# Patient Record
Sex: MALE | Race: WHITE | URBAN - METROPOLITAN AREA
[De-identification: names, ages, dates, MRNs, and addresses within clinical notes are randomized per-mention and may not be internally consistent; named-entity substitution may affect disease eponyms.]

---

## 2017-07-21 ENCOUNTER — TELEPHONE (OUTPATIENT)
Dept: FAMILY MEDICINE | Facility: CLINIC | Age: 52
End: 2017-07-21

## 2017-07-21 NOTE — TELEPHONE ENCOUNTER
7/21/2017        Patient does not have any insurance at the time and will call back to schedule when he does.        Outreach ,  Carol Mcdonald

## 2018-08-16 NOTE — PROGRESS NOTES
SUBJECTIVE:   Darin Wang is a 53 year old male who presents to clinic today for the following health issues:      Darin presents as a new patient to me regarding worsening depression symptoms over the past year, insomnia, family/life stress, left testicular mass enlargement, smoking cessation, and restless leg syndrome.    Depression - PHQ-9 of 26 with thoughts that he would maybe be better off dead that he often has first thing in the morning.  He was on zoloft in the past for a short time however doesn't remember if that helped.  No plans for therapy.    PHQ-9 SCORE 12/18/2012 6/10/2013 8/17/2018   Total Score 18 18 -   Total Score - - 26       Stress - lost his job, was homeless, is now living with his mother who he takes care of, which is a source of worry and stress for him.  Family members come to him for help, he helps other people in the neighborhood as well and he feels that it's too much to deal with at times.  He feels this is making his depression worse.    Insomnia - patient wakes up every 20 mins or so, lives in loud home, neighbors drink often and are loud outside at night.  Has tried over the counter medications, however not effective.    Testicular mass - diagnosed with testicular cyst in 2007 2.5cm in diameter, he was told to see a specialist if the mass changed.  He states that it has gotten larger since he was diagnosed, no other symptoms.    Tobacco use - patient smokes 5-6 cig per day and has been smoking since he was a teenager.  He has used chantix in the past which helped him cut down significantly and he would like to start taking it again.    Problem list and histories reviewed & adjusted, as indicated.  Additional history: as documented    BP Readings from Last 3 Encounters:   08/17/18 130/84   09/14/16 130/80   01/14/16 124/76    Wt Readings from Last 3 Encounters:   08/17/18 196 lb (88.9 kg)   09/14/16 199 lb 8 oz (90.5 kg)   01/14/16 199 lb 9.6 oz (90.5 kg)        Reviewed and  "updated as needed this visit by clinical staff  Tobacco  Allergies  Meds  Problems       Reviewed and updated as needed this visit by Provider  Allergies  Meds  Problems         ROS:  Constitutional, HEENT, cardiovascular, pulmonary, GI, , musculoskeletal, neuro, skin, endocrine and psych systems are negative, except as otherwise noted.    OBJECTIVE:     /84  Pulse 95  Temp 97.5  F (36.4  C) (Oral)  Resp 16  Ht 6' 0.09\" (1.831 m)  Wt 196 lb (88.9 kg)  SpO2 96%  BMI 26.52 kg/m2  Body mass index is 26.52 kg/(m^2).  GENERAL: healthy, alert, tearful and anxious during conversation  EYES: Eyes grossly normal to inspection, PERRL and conjunctivae and sclerae normal  HENT: ear canals and TM's normal, nose and mouth without ulcers or lesions  NECK: no adenopathy, no asymmetry, masses, or scars and thyroid normal to palpation  RESP: lungs clear to auscultation - no rales, rhonchi or wheezes  CV: regular rate and rhythm, normal S1 S2, no S3 or S4, no murmur, click or rub, no peripheral edema and peripheral pulses strong  ABDOMEN: soft, nontender, no hepatosplenomegaly, no masses and bowel sounds normal   - large left scrotal mass, nontender  MS: no gross musculoskeletal defects noted, no edema  SKIN: left maxillary contusion, no suspicious lesions or rashes  NEURO: Normal strength and tone, mentation intact and speech normal  PSYCH: mentation appears normal, tearful, anxious, normal speech    ASSESSMENT/PLAN:     1. Severe episode of recurrent major depressive disorder, without psychotic features (H)  Will start prozac, klonopin as needed, therapy recommended and patient will consider, follow-up in 3 weeks for dose adjustment.  - FLUoxetine (PROZAC) 10 MG capsule; Take 1 capsule (10 mg) by mouth daily  Dispense: 30 capsule; Refill: 1  - clonazePAM (KLONOPIN) 0.5 MG tablet; Take 0.5-1 tablets (0.25-0.5 mg) by mouth 2 times daily as needed for anxiety  Dispense: 20 tablet; Refill: 0    2. Primary " insomnia  - QUEtiapine (SEROQUEL) 100 MG tablet; Take 0.5 tablets (50 mg) by mouth At Bedtime  Dispense: 30 tablet; Refill: 1  - clonazePAM (KLONOPIN) 0.5 MG tablet; Take 0.5-1 tablets (0.25-0.5 mg) by mouth 2 times daily as needed for anxiety  Dispense: 20 tablet; Refill: 0    3. Stress and adjustment reaction  - clonazePAM (KLONOPIN) 0.5 MG tablet; Take 0.5-1 tablets (0.25-0.5 mg) by mouth 2 times daily as needed for anxiety  Dispense: 20 tablet; Refill: 0    4. Cyst of scrotum  - US Testicular and Scrotum; Future  - UROLOGY ADULT REFERRAL    5. Encounter for tobacco use cessation counseling  Potential side effects discussed along with potential for worsening suicidal ideation, patient understands.  - varenicline (CHANTIX STARTING MONTH CHANTALE) 0.5 MG X 11 & 1 MG X 42 tablet; Take 0.5 mg tab daily for 3 days, then 0.5 mg tab twice daily for 4 days, then 1 mg twice daily.  Dispense: 53 tablet; Refill: 0    A total of 45 minutes spent face-to-face with greater than 50% of the time spent in counseling and coordinating cares of the issues above    Follow-up in 3 weeks    Mario Rosenthal MD  [facility]

## 2018-08-17 ENCOUNTER — OFFICE VISIT (OUTPATIENT)
Dept: FAMILY MEDICINE | Facility: CLINIC | Age: 53
End: 2018-08-17
Payer: COMMERCIAL

## 2018-08-17 VITALS
WEIGHT: 196 LBS | RESPIRATION RATE: 16 BRPM | HEIGHT: 72 IN | OXYGEN SATURATION: 96 % | BODY MASS INDEX: 26.55 KG/M2 | DIASTOLIC BLOOD PRESSURE: 84 MMHG | TEMPERATURE: 97.5 F | HEART RATE: 95 BPM | SYSTOLIC BLOOD PRESSURE: 130 MMHG

## 2018-08-17 DIAGNOSIS — F51.01 PRIMARY INSOMNIA: ICD-10-CM

## 2018-08-17 DIAGNOSIS — F33.2 SEVERE EPISODE OF RECURRENT MAJOR DEPRESSIVE DISORDER, WITHOUT PSYCHOTIC FEATURES (H): Primary | ICD-10-CM

## 2018-08-17 DIAGNOSIS — Z71.6 ENCOUNTER FOR TOBACCO USE CESSATION COUNSELING: ICD-10-CM

## 2018-08-17 DIAGNOSIS — F43.29 STRESS AND ADJUSTMENT REACTION: ICD-10-CM

## 2018-08-17 DIAGNOSIS — L72.9 CYST OF SCROTUM: ICD-10-CM

## 2018-08-17 PROCEDURE — 99214 OFFICE O/P EST MOD 30 MIN: CPT | Performed by: FAMILY MEDICINE

## 2018-08-17 RX ORDER — QUETIAPINE FUMARATE 100 MG/1
50 TABLET, FILM COATED ORAL AT BEDTIME
Qty: 30 TABLET | Refills: 1 | Status: SHIPPED | OUTPATIENT
Start: 2018-08-17 | End: 2018-11-27 | Stop reason: ALTCHOICE

## 2018-08-17 RX ORDER — CLONAZEPAM 0.5 MG/1
0.25-0.5 TABLET ORAL 2 TIMES DAILY PRN
Qty: 20 TABLET | Refills: 0 | Status: SHIPPED | OUTPATIENT
Start: 2018-08-17 | End: 2018-11-27 | Stop reason: ALTCHOICE

## 2018-08-17 RX ORDER — FLUOXETINE 10 MG/1
10 CAPSULE ORAL DAILY
Qty: 30 CAPSULE | Refills: 1 | Status: SHIPPED | OUTPATIENT
Start: 2018-08-17 | End: 2018-11-27 | Stop reason: ALTCHOICE

## 2018-08-17 ASSESSMENT — PATIENT HEALTH QUESTIONNAIRE - PHQ9: 5. POOR APPETITE OR OVEREATING: MORE THAN HALF THE DAYS

## 2018-08-17 ASSESSMENT — ANXIETY QUESTIONNAIRES
6. BECOMING EASILY ANNOYED OR IRRITABLE: NEARLY EVERY DAY
7. FEELING AFRAID AS IF SOMETHING AWFUL MIGHT HAPPEN: NOT AT ALL
5. BEING SO RESTLESS THAT IT IS HARD TO SIT STILL: NEARLY EVERY DAY
2. NOT BEING ABLE TO STOP OR CONTROL WORRYING: NEARLY EVERY DAY
GAD7 TOTAL SCORE: 16
3. WORRYING TOO MUCH ABOUT DIFFERENT THINGS: NEARLY EVERY DAY
IF YOU CHECKED OFF ANY PROBLEMS ON THIS QUESTIONNAIRE, HOW DIFFICULT HAVE THESE PROBLEMS MADE IT FOR YOU TO DO YOUR WORK, TAKE CARE OF THINGS AT HOME, OR GET ALONG WITH OTHER PEOPLE: EXTREMELY DIFFICULT
1. FEELING NERVOUS, ANXIOUS, OR ON EDGE: MORE THAN HALF THE DAYS

## 2018-08-17 NOTE — MR AVS SNAPSHOT
After Visit Summary   8/17/2018    Darin Wang    MRN: [id number]           Patient Information     Date Of Birth          1965        Visit Information        Provider Department      8/17/2018 10:20 AM Mario Jaime MD [facility]        Today's Diagnoses     Severe episode of recurrent major depressive disorder, without psychotic features (H)    -  1    Primary insomnia        Stress and adjustment reaction        Cyst of scrotum        Encounter for tobacco use cessation counseling          Care Instructions      Depression  Depression is one of the most common mental health problems today. It is not just a state of unhappiness or sadness. It is a true disease. The cause seems to be related to a decrease in chemicals that transmit signals in the brain. Having a family history of depression, alcoholism, or suicide increases the risk. Chronic illness, chronic pain, migraine headaches, and high emotional stress also increase the risk.  Depression is something we tend to recognize in others, but may have a hard time seeing in ourselves. It can show in many physical and emotional ways:    Loss of appetite    Overeating    Not being able to sleep    Sleeping too much    Tiredness not related to physical exertion    Restlessness or irritability    Slowness of movement or speech    Feeling depressed or withdrawn    Loss of interest in things you once enjoyed    Trouble concentrating, poor memory, trouble making decisions    Thoughts of harming or killing oneself, or thoughts that life is not worth living    Low self-esteem  The treatment for depression may include both medicine and psychotherapy. Antidepressants can reduce suffering and can improve the ability to function during the depressed period. Therapy can offer emotional support and help you understand emotional factors that may be causing the depression.  Home care    Ongoing care and support help people  manage this disease. Find a healthcare provider and therapist who meet your needs. Seek help when you feel like you may be getting ill.    Be kind to yourself. Make it a point to do things that you enjoy (gardening, walking in nature, going to a movie). Reward yourself for small successes.    Take care of your physical body. Eat a balanced diet (low in saturated fat and high in fruits and vegetables). Exercise at least 3 times a week for 30 minutes. Even mild-moderate exercise (like brisk walking) can make you feel better.    Don't drink alcohol, which can make depression worse.    Take medicine as prescribed.    Tell each of your healthcare providers about all of the prescription and over-the-counter medicines, vitamins, and supplements you take. Certain supplements interact with medicines and can result in dangerous side effects. Ask your pharmacist when you have questions about medicine interactions.    Talk with your family and trusted friends about your feelings and thoughts. Ask them to help you recognize behavior changes early so you can get help and, if needed, medicine can be adjusted.  Follow-up care  Follow up with your healthcare provider, or as advised.  Call 911  Call 911 if you:    Have suicidal thoughts, a suicide plan, and the means to carry out the plan; or serious thoughts of hurting someone else     Have trouble breathing    Are very confused    Feel very drowsy or have trouble awakening    Faint or lose consciousness    Have new chest pain that becomes more severe, lasts longer, or spreads into your shoulder, arm, neck, jaw, or back  When to seek medical advice  Call your healthcare provider right away if any of these happen:    Feeling extreme depression, fear, anxiety, or anger toward yourself or others    Feeling out of control    Feeling that you may try to harm yourself or another    Hearing voices that others do not hear    Seeing things that others do not see    Can t sleep or eat for 3  days in a row    Friends or family express concern over your behavior and ask you to seek help  Date Last Reviewed: 10/1/2017    1802-2131 [employer]. [address] [facility], [place] [address]. All rights reserved. This information is not intended as a substitute for professional medical care. Always follow your healthcare professional's instructions.        Depression Affects Your Mind and Body  Everyone feels sad or  blue  from time to time for a few days or weeks. Depression is when these feelings don't go away and they interfere with daily life.  Depression is a real illness that can develop at any age. It is one of the most common mental health problems in the [place] Depression makes you feel sad, helpless, and hopeless. It gets in the way of your life and relationships. It inhibits your ability to think and act. But, with help, you can feel better again.      When I was depressed, I felt awful. I was so tired all the time I could hardly think, but at night I couldn t fall asleep. My head hurt. My stomach hurt. I didn t know what was wrong with me.    Depression affects your whole body  Brain chemicals affect your body as well as your mood. So depression may do more than just make you feel low. You may also feel bad physically. Depression can:    Cause trouble with mental tasks such as remembering, concentrating, or making decisions    Make you feel nervous and jumpy    Cause trouble sleeping. Or you may sleep too much    Change your appetite    Cause headaches, stomachaches, or other aches and pains    Drain your body of energy  Depression and other illness  It is common for people who have chronic health problems to also have depression. It can often be hard to tell which one caused the other. A person might become depressed after finding out they have a health problem. But some studies suggest being depressed may make certain health problems more likely. And some depressed people stop taking  care of themselves. This may make them more likely to get sick.  Date Last Reviewed: 1/1/2017 2000-2017 [employer]. [address] [facility], [place] [address]. All rights reserved. This information is not intended as a substitute for professional medical care. Always follow your healthcare professional's instructions.          Treating Insomnia  Good sleeping habits are a key part of treatment. If needed, some medications may help you sleep better at first. Making healthy lifestyle changes and learning to relax can improve your sleep. Treating insomnia takes commitment, but trust that your efforts will pay off. Talk to your health care provider before taking any medication.    Healthy Lifestyle  Your lifestyle affects your health and your sleep. Here are some healthy habits:    Keep a regular sleep schedule. Go to bed and get up at the same time each day.    Exercise regularly. It may help you reduce stress. Avoid strenuous exercise for two to four hours before bedtime.    Avoid or limit naps.    Use your bed only for sleep and sex.    Don t spend too much time in bed trying to fall asleep. If you can t fall asleep, get up and do something until you become tired and drowsy.    Avoid or limit caffeine and nicotine. They can keep you awake at night. Also avoid alcohol. It may help you fall asleep at first, but your sleep will not be restful.  Before Bedtime  To sleep better every night, try these tips:    Have a bedtime routine to let your body and mind know when it s time to sleep.    Going to bed should be relaxing so try to do only relaxing things around bedtime. Sleep will come sooner.    If your worries don t let you sleep, write them down in a diary. Then close it, and go to bed.    Make sure the room is not too hot or too cold. If it s not dark enough, an eye mask can help. If it s noisy, try using earplugs.  Learn to Relax  Stress, anxiety, and body tension may keep you awake at night. To  unwind before bedtime, try reading a book, meditation, or yoga. Also, try the following:    Deep breathing. Sit or lie back in a chair. Take a slow, deep breath. Hold it for 5 counts. Then breathe out slowly through your mouth. Keep doing this until you feel relaxed.    Imagery. Think of the last fun trip you took. In your mind, walk through the trip from start to finish. Put as much detail into the memory as you can remember. It will help you relax.  Cognitive Behavioral Therapy (CBT)  CBT is the most effective treatment for long-term insomnia. It tries to address the underlying causes of your sleep problems, including your habits and how you think about sleep.   Individual Therapy  Farshad Dupree, PhD  Insomnia   [facility] [facility]: [phone]    [facility]: [phone]  Group Therapy  Dates and times to be announced.  Online Programs    www.ThinkCER[employer] (pronounced shut eye). There is a fee for this program. Enter the code  [place]  if you decide to enroll in this program.     www.sleepIO.com (pronounced sleep ee oh). There is a fee for this program. Enter the code  [place]  if you decide to enroll in this program.  Suggested Resources  Insomnia Treatment Books:    Overcoming Insomnia by Chris Beck and Cindy Amaral (2008)    No More Sleepless Nights by James Kirby and Leticia Mancini (1996)    Say Aramis to Insomnia by James Guillen (2009)    The Insomnia Workbook by Mony Wylie and Jesus Cornelius (2009)    The Insomnia Answer by Otilio Cardoza and Canelo Esparza (2006)?  Stress Management and Relaxation Books:    The Relaxation and Stress Reduction Workbook by Juana Gates, Estelle Watkins and Rodolfo Hassan (2008)    Stress Management Workbook: Techniques and Self-Assessment Procedures by Felecia Silva and Chaparro Pastrana (1997)    A Mindfulness-Based Stress Reduction Workbook by Abdullahi Franks and Ivory Hamilton  (2010)    The Complete Stress Management Workbook by Rolando Corrigan, Aaron Mann and Cornelio Garcia (1996)    Assert Yourself by Shari Lemus and Alonzo Lemus (1977)  Relaxation Resources for Computer Download   These websites offer resources to help you relax. This list is for information only. [facility] is not responsible for the quality of services or the actions of any person or organization  Progressive Muscle Relaxation (PMR):    http://www.RealOps/progressive-muscle-relaxation-exercise.html    http://studentsupport.[facility]/counseling/resources/self-help/relaxation-and-stress-management/  Deep Breathing Exercises:    http://www.RealOps/breathing-awareness.html  Meditation:       www[facility] Services    www.zoiduguidedWho Can Fix My Carmeditation-site.com You may have to pay for some of these resources.  Guided Imagery:    http://www.RealOps/guided-imagery-scripts.html    http://[facility]/library/olbvbrfsle-laelnh-picqgdt/  Counseling / Behavioral Health  [facility]  Visit www.[email or web address].org or call [phone] to find a clinic close to you.   This is not a prescription and these resources are optional. You must pay for any costs when using these resources. Please ask your insurance carrier if you can be reimbursed for these resources. If so, you are responsible for sending the needed details to your insurance carrier. These resources may also be tax deductible as medical expenses. Check with your .  These programs and publications are not affiliated in any way with [facility].    2748-8002 [employer]. [address] [place] [address], [place] [address]. All rights reserved. This information is not intended as a substitute for professional medical care. Always follow your healthcare professional's instructions.  This information has been modified by your health care provider with permission from the  "publisher.      Tips for Sleep Hygiene  \"Sleep hygiene\" means having good sleep habits.Follow these tips to sleep better at night:     Get on a schedule. Go to bed and get up at about the same time every day.    Listen to your body. Only try to sleep when you actually feel tired or sleepy.    Be patient. If you haven't been able to get to sleep after about 30 minutes or more, get up and do something calming or boring until you feel sleepy. Then return to bed and try again.    Don't have caffeine (coffee, tea, cola drinks, chocolate and some medicines), alcohol or nicotine (cigarettes). These can make it harder for you to fall asleep and stay asleep.    Use your bed for sleeping only. That means no TV, computer or homework in bed, especially during the evening and before bedtime.    Don't nap during the day. If you must nap, make sure it is for less than 20 minutes.    Create sleep rituals that remind your body it is time to sleep. Examples include breathing exercises, stretching or reading a book.    Avoid all electronic media (smart phone, computer, tablet) within 2 hours of bed time. The \"blue light\" in these devices activates the part of the brain that keeps you awake.    Dim the lights at night.    Get early morning sources of light (walk in the sunshine) to help set sleep patterns at night.    Try a bath or shower before bed. Having a warm bath 1 to 2 hours before bedtime can help you feel sleepy. Hot baths can make you alert, so be mindful of the temperature.    Don't watch the clock. Checking the clock during the night can wake you up. It can also lead to negative thoughts such as, \"I will never fall asleep,\" which can increase anxiety and sleeplessness.    Use a sleep diary. Track your sleep schedule to know your sleep patterns and to see where you can improve.    Get regular exercise every day. Try not to do heavy exercise in the 4 hours before bedtime.    Eat a healthy, balanced diet.    Try eating a " light, healthy snack before bed, but avoid eating a heavy meal.    Create the right sleeping area. A cool, dark, quiet room is best. If needed, try earplugs, fans and blackout curtains.    Keep your daytime routine the same even if you have a bad night sleep. Avoiding activities the next day can make it harder to sleep.  For informational purposes only. Not to replace the advice of your health care provider.   Copyright   2013 [facility]. All rights reserved. [employer].fm 908956 - 01/16.  [facility]    If you have any questions regarding to your visit please contact your care team:       Team Purple:   Clinic Hours Telephone Number   Dr. Caron Rosenthal   7am-7pm  Monday - Thursday   7am-5pm  Fridays  [phone]  (Appointment scheduling available 24/7)    Questions about your recent visit?   Team Line:  [phone]   Urgent Care - [place] - 11am-9pm Monday-Friday Saturday-Sunday- 9am-5pm   [place] - 5pm-9pm Monday-Friday Saturday-Sunday- 9am-5pm  [phone] - [place]  [phone] [facility]       What options do I have for a visit other than an office visit? We offer electronic visits (e-visits) and telephone visits, when medically appropriate.  Please check with your medical insurance to see if these types of visits are covered, as you will be responsible for any charges that are not paid by your insurance.      You can use MediaTrust (secure electronic communication) to access to your chart, send your primary care provider a message, or make an appointment. Ask a team member how to get started.     For a price quote for your services, please call our Consumer Price Line at [phone] or our Imaging Cost estimation line at [phone] (for imaging tests).    Susan Nichols MA            Follow-ups after your visit        Additional Services     UROLOGY ADULT REFERRAL       Your provider has  referred you to: [facility] Z[facility] Fried [phone]   [email or web address]    Please be aware that coverage of these services is subject to the terms and limitations of your health insurance plan.  Call member services at your health plan with any benefit or coverage questions.      Please bring the following with you to your appointment:    (1) Any X-Rays, CTs or MRIs which have been performed.  Contact the facility where they were done to arrange for  prior to your scheduled appointment.    (2) List of current medications  (3) This referral request   (4) Any documents/labs given to you for this referral                  Follow-up notes from your care team     Return in about 3 weeks (around 9/7/2018).      Future tests that were ordered for you today     Open Future Orders        Priority Expected Expires Ordered    US Testicular and Scrotum Routine  8/17/2019 8/17/2018            Who to contact     If you have questions or need follow up information about today's clinic visit or your schedule please contact [facility] directly at [phone].  Normal or non-critical lab and imaging results will be communicated to you by MyChart, letter or phone within 4 business days after the clinic has received the results. If you do not hear from us within 7 days, please contact the clinic through VIOSOhart or phone. If you have a critical or abnormal lab result, we will notify you by phone as soon as possible.  Submit refill requests through [employer] or call your pharmacy and they will forward the refill request to us. Please allow 3 business days for your refill to be completed.          Additional Information About Your Visit        VIOSOhart Information     [employer] gives you secure access to your electronic health record. If you see a primary care provider, you can also send messages to your care team and make appointments. If you have questions,  "please call your primary care clinic.  If you do not have a primary care provider, please call [phone] and they will assist you.        Care EveryWhere ID     This is your Care EveryWhere ID. This could be used by other organizations to access your [place] medical records  [id number]        Your Vitals Were     Pulse Temperature Respirations Height Pulse Oximetry BMI (Body Mass Index)    95 97.5  F (36.4  C) (Oral) 16 6' 0.09\" (1.831 m) 96% 26.52 kg/m2       Blood Pressure from Last 3 Encounters:   08/17/18 130/84   09/14/16 130/80   01/14/16 124/76    Weight from Last 3 Encounters:   08/17/18 196 lb (88.9 kg)   09/14/16 199 lb 8 oz (90.5 kg)   01/14/16 199 lb 9.6 oz (90.5 kg)              We Performed the Following     UROLOGY ADULT REFERRAL          Today's Medication Changes          These changes are accurate as of 8/17/18 11:39 AM.  If you have any questions, ask your nurse or doctor.               Start taking these medicines.        Dose/Directions    clonazePAM 0.5 MG tablet   Commonly known as:  klonoPIN   Used for:  Severe episode of recurrent major depressive disorder, without psychotic features (H), Primary insomnia, Stress and adjustment reaction   Started by:  Mario Jaime MD        Dose:  0.25-0.5 mg   Take 0.5-1 tablets (0.25-0.5 mg) by mouth 2 times daily as needed for anxiety   Quantity:  20 tablet   Refills:  0       FLUoxetine 10 MG capsule   Commonly known as:  PROzac   Used for:  Severe episode of recurrent major depressive disorder, without psychotic features (H)   Started by:  Mario Jaime MD        Dose:  10 mg   Take 1 capsule (10 mg) by mouth daily   Quantity:  30 capsule   Refills:  1       QUEtiapine 100 MG tablet   Commonly known as:  SEROquel   Used for:  Primary insomnia   Started by:  Mario Jaime MD        Dose:  50 mg   Take 0.5 tablets (50 mg) by mouth At Bedtime   Quantity:  30 tablet   Refills:  1       "   These medicines have changed or have updated prescriptions.        Dose/Directions    * varenicline 1 MG tablet   Commonly known as:  CHANTIX   This may have changed:  Another medication with the same name was added. Make sure you understand how and when to take each.   Used for:  Smoker   Changed by:  Mario Jaime MD        Dose:  1 mg   Take 1 tablet (1 mg) by mouth 2 times daily   Quantity:  56 tablet   Refills:  1       * varenicline 0.5 MG X 11 & 1 MG X 42 tablet   Commonly known as:  CHANTIX STARTING MONTH PAK   This may have changed:  You were already taking a medication with the same name, and this prescription was added. Make sure you understand how and when to take each.   Used for:  Encounter for tobacco use cessation counseling   Changed by:  Mario Jaime MD        Take 0.5 mg tab daily for 3 days, then 0.5 mg tab twice daily for 4 days, then 1 mg twice daily.   Quantity:  53 tablet   Refills:  0       * Notice:  This list has 2 medication(s) that are the same as other medications prescribed for you. Read the directions carefully, and ask your doctor or other care provider to review them with you.         Where to get your medicines      These medications were sent to [id number]  [place] - [address] [facility] [address]  [address] [place] [address]     Phone:  [phone]     FLUoxetine 10 MG capsule    QUEtiapine 100 MG tablet    varenicline 0.5 MG X 11 & 1 MG X 42 tablet         Some of these will need a paper prescription and others can be bought over the counter.  Ask your nurse if you have questions.     Bring a paper prescription for each of these medications     clonazePAM 0.5 MG tablet                Primary Care Provider Office Phone # Fax #    Mario Jaime [phone] [address]  [place] [address]        Equal Access to Services      FERMIN DUNLAP : Hadii aad ku beatriz [place], waaxda luqadaha, qaybta kaalmada ademichael, tracey nikolasin hayaaregulo olmstead mayelin estelle . [facility] [phone].    ATENCIÓN: Si habla español, tiene a garcia disposición servicios gratuitos de asistencia lingüística. Llame al [phone].    We comply with applicable federal civil rights laws and [place] laws. We do not discriminate on the basis of race, color, national origin, age, disability, sex, sexual orientation, or gender identity.            Thank you!     Thank you for choosing [facility]  for your care. Our goal is always to provide you with excellent care. Hearing back from our patients is one way we can continue to improve our services. Please take a few minutes to complete the written survey that you may receive in the mail after your visit with us. Thank you!             Your Updated Medication List - Protect others around you: Learn how to safely use, store and throw away your medicines at www.disposemymeds.org.          This list is accurate as of 8/17/18 11:39 AM.  Always use your most recent med list.                   Brand Name Dispense Instructions for use Diagnosis    clonazePAM 0.5 MG tablet    klonoPIN    20 tablet    Take 0.5-1 tablets (0.25-0.5 mg) by mouth 2 times daily as needed for anxiety    Severe episode of recurrent major depressive disorder, without psychotic features (H), Primary insomnia, Stress and adjustment reaction       FLUoxetine 10 MG capsule    PROzac    30 capsule    Take 1 capsule (10 mg) by mouth daily    Severe episode of recurrent major depressive disorder, without psychotic features (H)       multivitamin Tabs tablet      Take 1 tablet by mouth daily        pramipexole 0.125 MG tablet    MIRAPEX    60 tablet    TAKE 1 TO 2 TABLETS BY MOUTH AT BEDTIME    Tobacco dependence syndrome       QUEtiapine 100 MG tablet    SEROquel    30 tablet    Take 0.5 tablets (50 mg) by mouth At Bedtime    Primary insomnia       *  varenicline 1 MG tablet    CHANTIX    56 tablet    Take 1 tablet (1 mg) by mouth 2 times daily    Smoker       * varenicline 0.5 MG X 11 & 1 MG X 42 tablet    CHANTIX STARTING MONTH CHANTALE    53 tablet    Take 0.5 mg tab daily for 3 days, then 0.5 mg tab twice daily for 4 days, then 1 mg twice daily.    Encounter for tobacco use cessation counseling       * Notice:  This list has 2 medication(s) that are the same as other medications prescribed for you. Read the directions carefully, and ask your doctor or other care provider to review them with you.

## 2018-08-17 NOTE — PATIENT INSTRUCTIONS
Depression  Depression is one of the most common mental health problems today. It is not just a state of unhappiness or sadness. It is a true disease. The cause seems to be related to a decrease in chemicals that transmit signals in the brain. Having a family history of depression, alcoholism, or suicide increases the risk. Chronic illness, chronic pain, migraine headaches, and high emotional stress also increase the risk.  Depression is something we tend to recognize in others, but may have a hard time seeing in ourselves. It can show in many physical and emotional ways:    Loss of appetite    Overeating    Not being able to sleep    Sleeping too much    Tiredness not related to physical exertion    Restlessness or irritability    Slowness of movement or speech    Feeling depressed or withdrawn    Loss of interest in things you once enjoyed    Trouble concentrating, poor memory, trouble making decisions    Thoughts of harming or killing oneself, or thoughts that life is not worth living    Low self-esteem  The treatment for depression may include both medicine and psychotherapy. Antidepressants can reduce suffering and can improve the ability to function during the depressed period. Therapy can offer emotional support and help you understand emotional factors that may be causing the depression.  Home care    Ongoing care and support help people manage this disease. Find a healthcare provider and therapist who meet your needs. Seek help when you feel like you may be getting ill.    Be kind to yourself. Make it a point to do things that you enjoy (gardening, walking in nature, going to a movie). Reward yourself for small successes.    Take care of your physical body. Eat a balanced diet (low in saturated fat and high in fruits and vegetables). Exercise at least 3 times a week for 30 minutes. Even mild-moderate exercise (like brisk walking) can make you feel better.    Don't drink alcohol, which can make depression  worse.    Take medicine as prescribed.    Tell each of your healthcare providers about all of the prescription and over-the-counter medicines, vitamins, and supplements you take. Certain supplements interact with medicines and can result in dangerous side effects. Ask your pharmacist when you have questions about medicine interactions.    Talk with your family and trusted friends about your feelings and thoughts. Ask them to help you recognize behavior changes early so you can get help and, if needed, medicine can be adjusted.  Follow-up care  Follow up with your healthcare provider, or as advised.  Call 911  Call 911 if you:    Have suicidal thoughts, a suicide plan, and the means to carry out the plan; or serious thoughts of hurting someone else     Have trouble breathing    Are very confused    Feel very drowsy or have trouble awakening    Faint or lose consciousness    Have new chest pain that becomes more severe, lasts longer, or spreads into your shoulder, arm, neck, jaw, or back  When to seek medical advice  Call your healthcare provider right away if any of these happen:    Feeling extreme depression, fear, anxiety, or anger toward yourself or others    Feeling out of control    Feeling that you may try to harm yourself or another    Hearing voices that others do not hear    Seeing things that others do not see    Can t sleep or eat for 3 days in a row    Friends or family express concern over your behavior and ask you to seek help  Date Last Reviewed: 10/1/2017    1637-8739 [employer]. [address] [place] [address]. All rights reserved. This information is not intended as a substitute for professional medical care. Always follow your healthcare professional's instructions.        Depression Affects Your Mind and Body  Everyone feels sad or  blue  from time to time for a few days or weeks. Depression is when these feelings don't go away and they interfere with daily life.   Depression is a real illness that can develop at any age. It is one of the most common mental health problems in the [place] Depression makes you feel sad, helpless, and hopeless. It gets in the way of your life and relationships. It inhibits your ability to think and act. But, with help, you can feel better again.      When I was depressed, I felt awful. I was so tired all the time I could hardly think, but at night I couldn t fall asleep. My head hurt. My stomach hurt. I didn t know what was wrong with me.    Depression affects your whole body  Brain chemicals affect your body as well as your mood. So depression may do more than just make you feel low. You may also feel bad physically. Depression can:    Cause trouble with mental tasks such as remembering, concentrating, or making decisions    Make you feel nervous and jumpy    Cause trouble sleeping. Or you may sleep too much    Change your appetite    Cause headaches, stomachaches, or other aches and pains    Drain your body of energy  Depression and other illness  It is common for people who have chronic health problems to also have depression. It can often be hard to tell which one caused the other. A person might become depressed after finding out they have a health problem. But some studies suggest being depressed may make certain health problems more likely. And some depressed people stop taking care of themselves. This may make them more likely to get sick.  Date Last Reviewed: 1/1/2017 2000-2017 [employer]. [address] [place] [address], [place] [address]. All rights reserved. This information is not intended as a substitute for professional medical care. Always follow your healthcare professional's instructions.          Treating Insomnia  Good sleeping habits are a key part of treatment. If needed, some medications may help you sleep better at first. Making healthy lifestyle changes and learning to relax can improve your sleep. Treating insomnia  takes commitment, but trust that your efforts will pay off. Talk to your health care provider before taking any medication.    Healthy Lifestyle  Your lifestyle affects your health and your sleep. Here are some healthy habits:    Keep a regular sleep schedule. Go to bed and get up at the same time each day.    Exercise regularly. It may help you reduce stress. Avoid strenuous exercise for two to four hours before bedtime.    Avoid or limit naps.    Use your bed only for sleep and sex.    Don t spend too much time in bed trying to fall asleep. If you can t fall asleep, get up and do something until you become tired and drowsy.    Avoid or limit caffeine and nicotine. They can keep you awake at night. Also avoid alcohol. It may help you fall asleep at first, but your sleep will not be restful.  Before Bedtime  To sleep better every night, try these tips:    Have a bedtime routine to let your body and mind know when it s time to sleep.    Going to bed should be relaxing so try to do only relaxing things around bedtime. Sleep will come sooner.    If your worries don t let you sleep, write them down in a diary. Then close it, and go to bed.    Make sure the room is not too hot or too cold. If it s not dark enough, an eye mask can help. If it s noisy, try using earplugs.  Learn to Relax  Stress, anxiety, and body tension may keep you awake at night. To unwind before bedtime, try reading a book, meditation, or yoga. Also, try the following:    Deep breathing. Sit or lie back in a chair. Take a slow, deep breath. Hold it for 5 counts. Then breathe out slowly through your mouth. Keep doing this until you feel relaxed.    Imagery. Think of the last fun trip you took. In your mind, walk through the trip from start to finish. Put as much detail into the memory as you can remember. It will help you relax.  Cognitive Behavioral Therapy (CBT)  CBT is the most effective treatment for long-term insomnia. It tries to address the  underlying causes of your sleep problems, including your habits and how you think about sleep.   Individual Therapy  Farshad Dupree, PhD  Insomnia   [facility]: [phone]    [facility]: [phone]  Group Therapy  Dates and times to be announced.  Online Programs    www.[employer] (pronounced shut eye). There is a fee for this program. Enter the code  [facility]  if you decide to enroll in this program.     www.sleepIO.com (pronounced sleep ee oh). There is a fee for this program. Enter the code  [facility]  if you decide to enroll in this program.  Suggested Resources  Insomnia Treatment Books:    Overcoming Insomnia by Chris Beck and Cindy Amaral (2008)    No More Sleepless Nights by James Kirby and Leticia Mancini (1996)    Say Aramis to Insomnia by James Guillen (2009)    The Insomnia Workbook by Mony Wylie and Jesus Cornelius (2009)    The Insomnia Answer by Otilio Cardoza and Canelo Esparza (2006)?  Stress Management and Relaxation Books:    The Relaxation and Stress Reduction Workbook by Juana Gates, Estelle Watkins and Rodolfo Hassan (2008)    Stress Management Workbook: Techniques and Self-Assessment Procedures by Felecia Silva and Chaparro Pastrana (1997)    A Mindfulness-Based Stress Reduction Workbook by Abdullahi Franks and Ivory Hamilton (2010)    The Complete Stress Management Workbook by Rolando Corrigan, Aaron Mann and Cornelio Garcia (1996)    Assert Yourself by Shari Lemus and Alonzo Lemus (1977)  Relaxation Resources for Computer Download   These websites offer resources to help you relax. This list is for information only. [facility] is not responsible for the quality of services or the actions of any person or organization  Progressive Muscle Relaxation (PMR):    http://www.[employer]/progressive-muscle-relaxation-exercise.html     "http://studentsupport.[facility]/counseling/resources/self-help/relaxation-and-stress-management/  Deep Breathing Exercises:    http://www.[employer]/breathing-awareness.html  Meditation:       www.[employer]ovo    www.[employer]INGSguidedCollegeHumormeditation-site.com You may have to pay for some of these resources.  Guided Imagery:    http://www.[employer]/guided-imagery-scripts.html    http://[employer]/library/xnsiuqmatw-sgpbeq-sujnkrx/  Counseling / Behavioral Health  [facility]  Visit www.[employer].[facility] or call [phone] to find a clinic close to you.   This is not a prescription and these resources are optional. You must pay for any costs when using these resources. Please ask your insurance carrier if you can be reimbursed for these resources. If so, you are responsible for sending the needed details to your insurance carrier. These resources may also be tax deductible as medical expenses. Check with your .  These programs and publications are not affiliated in any way with [employer].    2282-4783 [employer] [address] [place] [address], [place] [address]. All rights reserved. This information is not intended as a substitute for professional medical care. Always follow your healthcare professional's instructions.  This information has been modified by your health care provider with permission from the publisher.      Tips for Sleep Hygiene  \"Sleep hygiene\" means having good sleep habits.Follow these tips to sleep better at night:     Get on a schedule. Go to bed and get up at about the same time every day.    Listen to your body. Only try to sleep when you actually feel tired or sleepy.    Be patient. If you haven't been able to get to sleep after about 30 minutes or more, get up and do something calming or boring until you feel sleepy. Then return to bed and try again.    Don't have caffeine (coffee, tea, cola drinks, chocolate and " "some medicines), alcohol or nicotine (cigarettes). These can make it harder for you to fall asleep and stay asleep.    Use your bed for sleeping only. That means no TV, computer or homework in bed, especially during the evening and before bedtime.    Don't nap during the day. If you must nap, make sure it is for less than 20 minutes.    Create sleep rituals that remind your body it is time to sleep. Examples include breathing exercises, stretching or reading a book.    Avoid all electronic media (smart phone, computer, tablet) within 2 hours of bed time. The \"blue light\" in these devices activates the part of the brain that keeps you awake.    Dim the lights at night.    Get early morning sources of light (walk in the sunshine) to help set sleep patterns at night.    Try a bath or shower before bed. Having a warm bath 1 to 2 hours before bedtime can help you feel sleepy. Hot baths can make you alert, so be mindful of the temperature.    Don't watch the clock. Checking the clock during the night can wake you up. It can also lead to negative thoughts such as, \"I will never fall asleep,\" which can increase anxiety and sleeplessness.    Use a sleep diary. Track your sleep schedule to know your sleep patterns and to see where you can improve.    Get regular exercise every day. Try not to do heavy exercise in the 4 hours before bedtime.    Eat a healthy, balanced diet.    Try eating a light, healthy snack before bed, but avoid eating a heavy meal.    Create the right sleeping area. A cool, dark, quiet room is best. If needed, try earplugs, fans and blackout curtains.    Keep your daytime routine the same even if you have a bad night sleep. Avoiding activities the next day can make it harder to sleep.  For informational purposes only. Not to replace the advice of your health care provider.   Copyright   2013 [employer]. All rights reserved. Vdolg 126129   01/16.  [facility]    If you have " any questions regarding to your visit please contact your care team:       Team [facility]:   Clinic Hours Telephone Number   Dr. Caron Rosenthal   7am-7pm  Monday - Thursday   7am-5pm  Fridays  [phone]  (Appointment scheduling available 24/7)    Questions about your recent visit?   Team Line:  [phone]   Urgent Care - [place] [facility] - 11am-9pm Monday-Friday Saturday-Sunday- 9am-5pm   [address] - 5pm-9pm Monday-Friday Saturday-Sunday- 9am-5pm  [phone] - [place]  [phone] - [address]       What options do I have for a visit other than an office visit? We offer electronic visits (e-visits) and telephone visits, when medically appropriate.  Please check with your medical insurance to see if these types of visits are covered, as you will be responsible for any charges that are not paid by your insurance.      You can use [facility]OG (secure electronic communication) to access to your chart, send your primary care provider a message, or make an appointment. Ask a team member how to get started.     For a price quote for your services, please call our Consumer Price Line at [phone] or our Imaging Cost estimation line at [phone] (for imaging tests).    Susan Nichols MA

## 2018-08-17 NOTE — LETTER
My Depression Action Plan  Name: Darin Wang   Date of Birth 1965  Date: 8/17/2018    My doctor: Mario Jaime   My clinic: [address]  [place] [address]  [phone]          GREEN    ZONE   Good Control    What it looks like:     Things are going generally well. You have normal up s and down s. You may even feel depressed from time to time, but bad moods usually last less than a day.   What you need to do:  1. Continue to care for yourself (see self care plan)  2. Check your depression survival kit and update it as needed  3. Follow your physician s recommendations including any medication.  4. Do not stop taking medication unless you consult with your physician first.           YELLOW         ZONE Getting Worse    What it looks like:     Depression is starting to interfere with your life.     It may be hard to get out of bed; you may be starting to isolate yourself from others.    Symptoms of depression are starting to last most all day and this has happened for several days.     You may have suicidal thoughts but they are not constant.   What you need to do:     1. Call your care team, your response to treatment will improve if you keep your care team informed of your progress. Yellow periods are signs an adjustment may need to be made.     2. Continue your self-care, even if you have to fake it!    3. Talk to someone in your support network    4. Open up your depression survival kit           RED    ZONE Medical Alert - Get Help    What it looks like:     Depression is seriously interfering with your life.     You may experience these or other symptoms: You can t get out of bed most days, can t work or engage in other necessary activities, you have trouble taking care of basic hygiene, or basic responsibilities, thoughts of suicide or death that will not go away, self-injurious behavior.     What you need to do:  1. Call your  care team and request a same-day appointment. If they are not available (weekends or after hours) call your local crisis line, emergency room or 911.            Depression Self Care Plan / Survival Kit    Self-Care for Depression  Here s the deal. Your body and mind are really not as separate as most people think.  What you do and think affects how you feel and how you feel influences what you do and think. This means if you do things that people who feel good do, it will help you feel better.  Sometimes this is all it takes.  There is also a place for medication and therapy depending on how severe your depression is, so be sure to consult with your medical provider and/ or Behavioral Health Consultant if your symptoms are worsening or not improving.     In order to better manage my stress, I will:    Exercise  Get some form of exercise, every day. This will help reduce pain and release endorphins, the  feel good  chemicals in your brain. This is almost as good as taking antidepressants!  This is not the same as joining a gym and then never going! (they count on that by the way ) It can be as simple as just going for a walk or doing some gardening, anything that will get you moving.      Hygiene   Maintain good hygiene (Get out of bed in the morning, Make your bed, Brush your teeth, Take a shower, and Get dressed like you were going to work, even if you are unemployed).  If your clothes don't fit try to get ones that do.    Diet  I will strive to eat foods that are good for me, drink plenty of water, and avoid excessive sugar, caffeine, alcohol, and other mood-altering substances.  Some foods that are helpful in depression are: complex carbohydrates, B vitamins, flaxseed, fish or fish oil, fresh fruits and vegetables.    Psychotherapy  I agree to participate in Individual Therapy (if recommended).    Medication  If prescribed medications, I agree to take them.  Missing doses can result in serious side effects.  I  understand that drinking alcohol, or other illicit drug use, may cause potential side effects.  I will not stop my medication abruptly without first discussing it with my provider.    Staying Connected With Others  I will stay in touch with my friends, family members, and my primary care provider/team.    Use your imagination  Be creative.  We all have a creative side; it doesn t matter if it s oil painting, sand castles, or mud pies! This will also kick up the endorphins.    Witness Beauty  (AKA stop and smell the roses) Take a look outside, even in mid-winter. Notice colors, textures. Watch the squirrels and birds.     Service to others  Be of service to others.  There is always someone else in need.  By helping others we can  get out of ourselves  and remember the really important things.  This also provides opportunities for practicing all the other parts of the program.    Humor  Laugh and be silly!  Adjust your TV habits for less news and crime-drama and more comedy.    Control your stress  Try breathing deep, massage therapy, biofeedback, and meditation. Find time to relax each day.     My support system    Clinic Contact:  Phone number:    Contact 1:  Phone number:    Contact 2:  Phone number:    Christian/:  Phone number:    Therapist:  Phone number:    Local crisis center:    Phone number:    Other community support:  Phone number:

## 2018-08-18 ASSESSMENT — PATIENT HEALTH QUESTIONNAIRE - PHQ9: SUM OF ALL RESPONSES TO PHQ QUESTIONS 1-9: 26

## 2018-08-18 ASSESSMENT — ANXIETY QUESTIONNAIRES: GAD7 TOTAL SCORE: 16

## 2018-08-24 ENCOUNTER — RADIANT APPOINTMENT (OUTPATIENT)
Dept: ULTRASOUND IMAGING | Facility: CLINIC | Age: 53
End: 2018-08-24
Attending: FAMILY MEDICINE
Payer: COMMERCIAL

## 2018-08-24 DIAGNOSIS — L72.9 CYST OF SCROTUM: ICD-10-CM

## 2018-08-24 PROCEDURE — 93976 VASCULAR STUDY: CPT

## 2018-08-24 PROCEDURE — 76870 US EXAM SCROTUM: CPT

## 2018-09-06 ENCOUNTER — OFFICE VISIT (OUTPATIENT)
Dept: ORTHOPEDICS | Facility: CLINIC | Age: 53
End: 2018-09-06
Payer: COMMERCIAL

## 2018-09-06 VITALS
HEART RATE: 90 BPM | DIASTOLIC BLOOD PRESSURE: 79 MMHG | SYSTOLIC BLOOD PRESSURE: 116 MMHG | WEIGHT: 195 LBS | OXYGEN SATURATION: 98 % | HEIGHT: 72 IN | RESPIRATION RATE: 13 BRPM | BODY MASS INDEX: 26.41 KG/M2

## 2018-09-06 DIAGNOSIS — M16.0 PRIMARY OSTEOARTHRITIS OF BOTH HIPS: Primary | ICD-10-CM

## 2018-09-06 PROCEDURE — 99203 OFFICE O/P NEW LOW 30 MIN: CPT | Performed by: ORTHOPAEDIC SURGERY

## 2018-09-06 RX ORDER — DICLOFENAC SODIUM 75 MG/1
75 TABLET, DELAYED RELEASE ORAL 2 TIMES DAILY
Qty: 60 TABLET | Refills: 11 | Status: SHIPPED | OUTPATIENT
Start: 2018-09-06

## 2018-09-06 NOTE — MR AVS SNAPSHOT
After Visit Summary   9/6/2018    Darin Wang    MRN: [id number]           Patient Information     Date Of Birth          1965        Visit Information        Provider Department      9/6/2018 3:45 PM Darin Lazo MD [facility]        Today's Diagnoses     Primary osteoarthritis of both hips    -  1       Follow-ups after your visit        Your next 10 appointments already scheduled     Sep 07, 2018  3:00 PM CDT   Office Visit with Mario Jaime MD   [facility] ([facility])    [address] [address] [phone]           Bring a current list of meds and any records pertaining to this visit. For Physicals, please bring immunization records and any forms needing to be filled out. Please arrive 10 minutes early to complete paperwork.              Who to contact     If you have questions or need follow up information about today's clinic visit or your schedule please contact [facility] directly at [phone].  Normal or non-critical lab and imaging results will be communicated to you by [employer], letter or phone within 4 business days after the clinic has received the results. If you do not hear from us within 7 days, please contact the clinic through [employer] or phone. If you have a critical or abnormal lab result, we will notify you by phone as soon as possible.  Submit refill requests through [employer] or call your pharmacy and they will forward the refill request to us. Please allow 3 business days for your refill to be completed.          Additional Information About Your Visit        [employer] Information     [employer] gives you secure access to your electronic health record. If you see a primary care provider, you can also send messages to your care team and make appointments. If you have questions, please call your primary care clinic.  If you do not have a primary care provider, please  call [phone] and they will assist you.        Care EveryWhere ID     This is your Care EveryWhere ID. This could be used by other organizations to access your [facility] medical records  [id number]        Your Vitals Were     Pulse Respirations Height Pulse Oximetry BMI (Body Mass Index)       90 13 1.829 m (6') 98% 26.45 kg/m2        Blood Pressure from Last 3 Encounters:   09/06/18 116/79   08/17/18 130/84   09/14/16 130/80    Weight from Last 3 Encounters:   09/06/18 88.5 kg (195 lb)   08/17/18 88.9 kg (196 lb)   09/14/16 90.5 kg (199 lb 8 oz)              Today, you had the following     No orders found for display         Today's Medication Changes          These changes are accurate as of 9/6/18 11:59 PM.  If you have any questions, ask your nurse or doctor.               Start taking these medicines.        Dose/Directions    diclofenac 75 MG EC tablet   Commonly known as:  VOLTAREN   Used for:  Primary osteoarthritis of both hips   Started by:  Darin Lazo MD        Dose:  75 mg   Take 1 tablet (75 mg) by mouth 2 times daily   Quantity:  60 tablet   Refills:  11            Where to get your medicines      These medications were sent to [facility] [id number]  [place] - [address] AT [facility] & [place]  [address] [facility] [place] [address]     Phone:  [phone]     diclofenac 75 MG EC tablet                Primary Care Provider Office Phone # Fax #    Mario Jaime [phone] [address] [facility]  [facility] [address]        Equal Access to Services     RJ DUNLAP : Hadfede Frederick, wadenada luqfreeman, qaybta kaaltracey stokes. So [facility] [phone].    ATENCIÓN: Si habla español, tiene a garcia disposición servicios gratuitos de asistencia lingüística. Llame al [phone].    We comply with applicable federal civil rights laws and [place] laws. We do not  discriminate on the basis of race, color, national origin, age, disability, sex, sexual orientation, or gender identity.            Thank you!     Thank you for choosing [facility]  for your care. Our goal is always to provide you with excellent care. Hearing back from our patients is one way we can continue to improve our services. Please take a few minutes to complete the written survey that you may receive in the mail after your visit with us. Thank you!             Your Updated Medication List - Protect others around you: Learn how to safely use, store and throw away your medicines at www.disposemymeds.org.          This list is accurate as of 9/6/18 11:59 PM.  Always use your most recent med list.                   Brand Name Dispense Instructions for use Diagnosis    clonazePAM 0.5 MG tablet    klonoPIN    20 tablet    Take 0.5-1 tablets (0.25-0.5 mg) by mouth 2 times daily as needed for anxiety    Severe episode of recurrent major depressive disorder, without psychotic features (H), Primary insomnia, Stress and adjustment reaction       diclofenac 75 MG EC tablet    VOLTAREN    60 tablet    Take 1 tablet (75 mg) by mouth 2 times daily    Primary osteoarthritis of both hips       FLUoxetine 10 MG capsule    PROzac    30 capsule    Take 1 capsule (10 mg) by mouth daily    Severe episode of recurrent major depressive disorder, without psychotic features (H)       multivitamin Tabs tablet      Take 1 tablet by mouth daily        pramipexole 0.125 MG tablet    MIRAPEX    60 tablet    TAKE 1 TO 2 TABLETS BY MOUTH AT BEDTIME    Tobacco dependence syndrome       QUEtiapine 100 MG tablet    SEROquel    30 tablet    Take 0.5 tablets (50 mg) by mouth At Bedtime    Primary insomnia       * varenicline 1 MG tablet    CHANTIX    56 tablet    Take 1 tablet (1 mg) by mouth 2 times daily    Smoker       * varenicline 0.5 MG X 11 & 1 MG X 42 tablet    CHANTIX STARTING MONTH CHANTALE    53 tablet    Take 0.5 mg tab  daily for 3 days, then 0.5 mg tab twice daily for 4 days, then 1 mg twice daily.    Encounter for tobacco use cessation counseling       * Notice:  This list has 2 medication(s) that are the same as other medications prescribed for you. Read the directions carefully, and ask your doctor or other care provider to review them with you.

## 2018-09-06 NOTE — LETTER
9/6/2018         RE: Darin Wang  [address] [facility] [facility] [address]        Dear Colleague,    Thank you for referring your patient, Darin Wang, to the [facility]. Please see a copy of my visit note below.    Darin Wang is a 53 year old male who is seen as self referral for bilateral hip pain.    Past Medical History:   Diagnosis Date     Anxiety      Depression      ED (erectile dysfunction)      Insomnia      Low back pain        History reviewed. No pertinent surgical history.    Family History   Problem Relation Age of Onset     Diabetes Mother      Hypertension Mother      Cancer Father      Unknown/Adopted Maternal Grandmother      Unknown/Adopted Maternal Grandfather      Unknown/Adopted Paternal Grandmother      Unknown/Adopted Paternal Grandfather      Eye Disorder Brother        Social History     Social History     Marital status:      Spouse name: N/A     Number of children: N/A     Years of education: N/A     Occupational History     Not on file.     Social History Main Topics     Smoking status: Current Every Day Smoker     Packs/day: 0.02     Types: Cigarettes     Smokeless tobacco: Never Used     Alcohol use Yes     Drug use: No     Sexual activity: Yes     Partners: Female     Other Topics Concern     Not on file     Social History Narrative       Current Outpatient Prescriptions   Medication Sig Dispense Refill     clonazePAM (KLONOPIN) 0.5 MG tablet Take 0.5-1 tablets (0.25-0.5 mg) by mouth 2 times daily as needed for anxiety 20 tablet 0     diclofenac (VOLTAREN) 75 MG EC tablet Take 1 tablet (75 mg) by mouth 2 times daily 60 tablet 11     FLUoxetine (PROZAC) 10 MG capsule Take 1 capsule (10 mg) by mouth daily 30 capsule 1     multivitamin (OCUVITE) TABS Take 1 tablet by mouth daily       pramipexole (MIRAPEX) 0.125 MG tablet TAKE 1 TO 2 TABLETS BY MOUTH AT BEDTIME 60 tablet 2     QUEtiapine (SEROQUEL) 100 MG tablet Take 0.5 tablets (50 mg) by mouth  At Bedtime 30 tablet 1     varenicline (CHANTIX STARTING MONTH CHANTALE) 0.5 MG X 11 & 1 MG X 42 tablet Take 0.5 mg tab daily for 3 days, then 0.5 mg tab twice daily for 4 days, then 1 mg twice daily. 53 tablet 0     varenicline (CHANTIX) 1 MG tablet Take 1 tablet (1 mg) by mouth 2 times daily 56 tablet 1       No Known Allergies    REVIEW OF SYSTEMS:  CONSTITUTIONAL:  NEGATIVE for fever, chills, change in weight, not feeling tired  SKIN:  NEGATIVE for worrisome rashes, no skin lumps, no skin ulcers and no non-healing wounds  EYES:  NEGATIVE for vision changes or irritation.  ENT/MOUTH:  NEGATIVE.  No hearing loss, no hoarseness, no difficulty swallowing.  RESP:  NEGATIVE. No cough or shortness of breath.  CV:  NEGATIVE for chest pain, palpitations or peripheral edema  GI:  NEGATIVE for nausea, abdominal pain, heartburn, or change in bowel habits  :  Negative. No dysuria, no hematuria  MUSCULOSKELETAL:  See HPI above  NEURO:  NEGATIVE . No headaches, no dizziness,  no numbness  ENDOCRINE:  NEGATIVE for temperature intolerance, skin/hair changes  HEME/ALLERGY/IMMUNE:  NEGATIVE for bleeding problems  PSYCHIATRIC:  NEGATIVE. no anxiety, no depression.     Exam:  Vitals: /79  Pulse 90  Resp 13  Ht 1.829 m (6')  Wt 88.5 kg (195 lb)  SpO2 98%  BMI 26.45 kg/m2  BMI= Body mass index is 26.45 kg/(m^2).  Constitutional:  healthy, alert and no distress  Neuro: Alert and Oriented x 3, Sensation grossly WNL.  Psych: Affect normal   Respiratory: Breathing not labored.  Cardiovascular: normal peripheral pulses  Lymph: no adenopathy  Skin: No rashes,worrisome lesions or skin problems      Again, thank you for allowing me to participate in the care of your patient.        Sincerely,        Darin Lazo MD

## 2018-09-07 PROBLEM — M16.0 PRIMARY OSTEOARTHRITIS OF BOTH HIPS: Status: ACTIVE | Noted: 2018-09-07

## 2018-09-07 NOTE — PROGRESS NOTES
Visit Date:   2018      HISTORY OF PRESENT ILLNESS:  Mr. Wang is a 53-year-old male seen with bilateral hip pain.  He has had pain somewhat for the last 10 years but has been getting worse lately.  He has sharp, aching, shooting pains rated up to 7-8 out 10, worse with working and being on his feet as a .  Better with some flexibility exercises of the hips.  He has had back problems before as well and has had epidural steroid injection years ago.  Still has some back problems.  X-rays have not been performed.      PHYSICAL EXAMINATION:  Examination shows no tenderness at the SI joints.  He does have some tenderness in the left sciatic notch.  He has no significant tenderness over the greater trochanter on either side.  He has pain with rotation of the hips, but has extremely good rotation with external rotation to 70 degrees, internal rotation to about 40 degrees.  He does have pain with full internal rotation on the right hip and full external rotation on the left hip.  He does have a positive impingement sign on the left hip with internal rotation and flexion.  He did not have as much problem with the right hip with this.  He has good range of motion of the knees.  Sensation and circulation are intact.  He walks without antalgic gait.      IMPRESSION:  Bilateral hip arthritis, likely mild at this point as he has very good motion.  He has not pursued conservative treatment as of yet.  I have recommended starting diclofenac 75 mg b.i.d.  Continue activities as tolerated.  If pain continues, we should see him back with x-rays of the pelvis and lateral hips.         GEORGI SLADE MD             D: 2018   T: 2018   MT: ALEX      Name:     GEORGI WANG   MRN:      [id number]        Account:      [id number]   :      1965           Visit Date:   2018      Document: [id number]

## 2018-09-07 NOTE — PROGRESS NOTES
Darin Wang is a 53 year old male who is seen as self referral for bilateral hip pain.    Past Medical History:   Diagnosis Date     Anxiety      Depression      ED (erectile dysfunction)      Insomnia      Low back pain        History reviewed. No pertinent surgical history.    Family History   Problem Relation Age of Onset     Diabetes Mother      Hypertension Mother      Cancer Father      Unknown/Adopted Maternal Grandmother      Unknown/Adopted Maternal Grandfather      Unknown/Adopted Paternal Grandmother      Unknown/Adopted Paternal Grandfather      Eye Disorder Brother        Social History     Social History     Marital status:      Spouse name: N/A     Number of children: N/A     Years of education: N/A     Occupational History     Not on file.     Social History Main Topics     Smoking status: Current Every Day Smoker     Packs/day: 0.02     Types: Cigarettes     Smokeless tobacco: Never Used     Alcohol use Yes     Drug use: No     Sexual activity: Yes     Partners: Female     Other Topics Concern     Not on file     Social History Narrative       Current Outpatient Prescriptions   Medication Sig Dispense Refill     clonazePAM (KLONOPIN) 0.5 MG tablet Take 0.5-1 tablets (0.25-0.5 mg) by mouth 2 times daily as needed for anxiety 20 tablet 0     diclofenac (VOLTAREN) 75 MG EC tablet Take 1 tablet (75 mg) by mouth 2 times daily 60 tablet 11     FLUoxetine (PROZAC) 10 MG capsule Take 1 capsule (10 mg) by mouth daily 30 capsule 1     multivitamin (OCUVITE) TABS Take 1 tablet by mouth daily       pramipexole (MIRAPEX) 0.125 MG tablet TAKE 1 TO 2 TABLETS BY MOUTH AT BEDTIME 60 tablet 2     QUEtiapine (SEROQUEL) 100 MG tablet Take 0.5 tablets (50 mg) by mouth At Bedtime 30 tablet 1     varenicline (CHANTIX STARTING MONTH PAK) 0.5 MG X 11 & 1 MG X 42 tablet Take 0.5 mg tab daily for 3 days, then 0.5 mg tab twice daily for 4 days, then 1 mg twice daily. 53 tablet 0     varenicline (CHANTIX) 1 MG  tablet Take 1 tablet (1 mg) by mouth 2 times daily 56 tablet 1       No Known Allergies    REVIEW OF SYSTEMS:  CONSTITUTIONAL:  NEGATIVE for fever, chills, change in weight, not feeling tired  SKIN:  NEGATIVE for worrisome rashes, no skin lumps, no skin ulcers and no non-healing wounds  EYES:  NEGATIVE for vision changes or irritation.  ENT/MOUTH:  NEGATIVE.  No hearing loss, no hoarseness, no difficulty swallowing.  RESP:  NEGATIVE. No cough or shortness of breath.  CV:  NEGATIVE for chest pain, palpitations or peripheral edema  GI:  NEGATIVE for nausea, abdominal pain, heartburn, or change in bowel habits  :  Negative. No dysuria, no hematuria  MUSCULOSKELETAL:  See HPI above  NEURO:  NEGATIVE . No headaches, no dizziness,  no numbness  ENDOCRINE:  NEGATIVE for temperature intolerance, skin/hair changes  HEME/ALLERGY/IMMUNE:  NEGATIVE for bleeding problems  PSYCHIATRIC:  NEGATIVE. no anxiety, no depression.     Exam:  Vitals: /79  Pulse 90  Resp 13  Ht 1.829 m (6')  Wt 88.5 kg (195 lb)  SpO2 98%  BMI 26.45 kg/m2  BMI= Body mass index is 26.45 kg/(m^2).  Constitutional:  healthy, alert and no distress  Neuro: Alert and Oriented x 3, Sensation grossly WNL.  Psych: Affect normal   Respiratory: Breathing not labored.  Cardiovascular: normal peripheral pulses  Lymph: no adenopathy  Skin: No rashes,worrisome lesions or skin problems

## 2018-11-26 NOTE — PROGRESS NOTES
[address]  [place] [address]  [phone]  Dept: [phone]    PRE-OP EVALUATION:  Today's date: 2018    Darin Wang (: 1965) presents for pre-operative evaluation assessment as requested by Dr. Lyons.  He requires evaluation and anesthesia risk assessment prior to undergoing surgery/procedure for treatment of Tooth pain .    Proposed Surgery/ Procedure: Tooth extraction and filling  Date of Surgery/ Procedure: 1993  Time of Surgery/ Procedure: 2:00pm  Hospital/Surgical Facility: [facility]  Fax number for surgical facility: [phone]  Primary Physician: Mario Jaime  Type of Anesthesia Anticipated: to be determined    Patient has a Health Care Directive or Living Will:  NO    1. NO - Do you have a history of heart attack, stroke, stent, bypass or surgery on an artery in the head, neck, heart or legs?  2. NO - Do you ever have any pain or discomfort in your chest?  3. NO - Do you have a history of  Heart Failure?  4. NO - Are you troubled by shortness of breath when: walking on the level, up a slight hill or at night?  5. NO - Do you currently have a cold, bronchitis or other respiratory infection?  6. NO - Do you have a cough, shortness of breath or wheezing?  7. NO - Do you sometimes get pains in the calves of your legs when you walk?  8. NO - Do you or anyone in your family have previous history of blood clots?  9. NO - Do you or does anyone in your family have a serious bleeding problem such as prolonged bleeding following surgeries or cuts?  10. NO - Have you ever had problems with anemia or been told to take iron pills?  11. NO - Have you had any abnormal blood loss such as black, tarry or bloody stools, or abnormal vaginal bleeding?  12. NO - Have you ever had a blood transfusion?  13. NO - Have you or any of your relatives ever had problems with anesthesia?  14. NO - Do you have sleep apnea, excessive  snoring or daytime drowsiness?  15. NO - Do you have any prosthetic heart valves?  16. NO - Do you have prosthetic joints?  17. NO - Is there any chance that you may be pregnant?    HPI:     HPI related to upcoming procedure: Patient presents for pre op eval in antipation of upper teeth removal and filling in bottom teeth.      DEPRESSION - Patient has a long history of Depression of moderate severity requiring medication for control with recent symptoms being stable..Current symptoms of depression include none.                                                                                                                                                                                    .    MEDICAL HISTORY:     Patient Active Problem List    Diagnosis Date Noted     Primary osteoarthritis of both hips 09/07/2018     Priority: Medium     Restless legs syndrome (RLS) 02/02/2016     Priority: Medium     Anxiety 12/27/2012     Priority: Medium     Spermatocele, left 12/27/2012     Priority: Medium     CARDIOVASCULAR SCREENING; LDL GOAL LESS THAN 160 12/18/2012     Priority: Medium     Mild major depression (H) 12/18/2012     Priority: Medium      Past Medical History:   Diagnosis Date     Anxiety      Depression      ED (erectile dysfunction)      Insomnia      Low back pain      No past surgical history on file.  Current Outpatient Prescriptions   Medication Sig Dispense Refill     multivitamin (OCUVITE) TABS Take 1 tablet by mouth daily       traZODone (DESYREL) 50 MG tablet Take 1 tablet (50 mg) by mouth nightly as needed for sleep 30 tablet 1     varenicline (CHANTIX STARTING MONTH CHANTALE) 0.5 MG X 11 & 1 MG X 42 tablet Take 0.5 mg tab daily for 3 days, then 0.5 mg tab twice daily for 4 days, then 1 mg twice daily. 53 tablet 0     varenicline (CHANTIX) 1 MG tablet Take 1 tablet (1 mg) by mouth 2 times daily 56 tablet 1     diclofenac (VOLTAREN) 75 MG EC tablet Take 1 tablet (75 mg) by mouth 2 times daily (Patient not  taking: Reported on 11/27/2018) 60 tablet 11     pramipexole (MIRAPEX) 0.125 MG tablet TAKE 1 TO 2 TABLETS BY MOUTH AT BEDTIME (Patient not taking: Reported on 11/27/2018) 60 tablet 2     OTC products: None, except as noted above    No Known Allergies   Latex Allergy: NO    Social History   Substance Use Topics     Smoking status: Current Every Day Smoker     Packs/day: 0.02     Types: Cigarettes     Smokeless tobacco: Never Used     Alcohol use Yes     History   Drug Use No       REVIEW OF SYSTEMS:   Constitutional, neuro, ENT, endocrine, pulmonary, cardiac, gastrointestinal, genitourinary, musculoskeletal, integument and psychiatric systems are negative, except as otherwise noted.    EXAM:   /84  Pulse 77  Temp 97.8  F (36.6  C) (Oral)  Resp 18  Wt 199 lb 6.4 oz (90.4 kg)  SpO2 96%  BMI 27.04 kg/m2    GENERAL APPEARANCE: healthy, alert and no distress     EYES: EOMI,  PERRL     HENT: ear canals and TM's normal and nose and mouth without ulcers or lesions     NECK: no adenopathy, no asymmetry, masses, or scars and thyroid normal to palpation     RESP: lungs clear to auscultation - no rales, rhonchi or wheezes     CV: regular rates and rhythm, normal S1 S2, no S3 or S4 and no murmur, click or rub     ABDOMEN:  soft, nontender, no HSM or masses and bowel sounds normal     MS: extremities normal- no gross deformities noted, no evidence of inflammation in joints, FROM in all extremities.     SKIN: no suspicious lesions or rashes     NEURO: Normal strength and tone, sensory exam grossly normal, mentation intact and speech normal     PSYCH: mentation appears normal. and affect normal/bright     LYMPHATICS: No cervical adenopathy    DIAGNOSTICS:   No labs or EKG required for low risk surgery (cataract, skin procedure, breast biopsy, etc)    Recent Labs   Lab Test  01/14/16   1505 11/16/14 09/23/14   HGB  14.8  15.5  16.4   PLT  278   --    --    NA  139   --   137   POTASSIUM  4.2   --   4.7   CR  0.84  0.78   0.88      IMPRESSION:   Reason for surgery/procedure: Dental procedure, teeth removal  Diagnosis/reason for consult: Preop evaluation    The proposed surgical procedure is considered LOW risk.    REVISED CARDIAC RISK INDEX  The patient has the following serious cardiovascular risks for perioperative complications such as (MI, PE, VFib and 3  AV Block):  No serious cardiac risks  INTERPRETATION: 0 risks: Class I (very low risk - 0.4% complication rate)    The patient has the following additional risks for perioperative complications:  No identified additional risks      ICD-10-CM    1. Preop general physical exam Z01.818    2. Primary insomnia F51.01 traZODone (DESYREL) 50 MG tablet   3. Smoker F17.200 varenicline (CHANTIX) 1 MG tablet       RECOMMENDATIONS:     --Consult hospital rounder / IM to assist post-op medical management    --Patient is to take all scheduled medications on the day of surgery EXCEPT for modifications listed below.    APPROVAL GIVEN to proceed with proposed procedure, without further diagnostic evaluation       Signed Electronically by: Mario Rosenthal MD    Copy of this evaluation report is provided to requesting physician.    [facility] Preop Guidelines    Revised Cardiac Risk Index

## 2018-11-26 NOTE — PATIENT INSTRUCTIONS
Before Your Surgery      Call your surgeon if there is any change in your health. This includes signs of a cold or flu (such as a sore throat, runny nose, cough, rash or fever).    Do not smoke, drink alcohol or take over the counter medicine (unless your surgeon or primary care doctor tells you to) for the 24 hours before and after surgery.    If you take prescribed drugs: Follow your doctor s orders about which medicines to take and which to stop until after surgery.    Eating and drinking prior to surgery: follow the instructions from your surgeon    Take a shower or bath the night before surgery. Use the soap your surgeon gave you to gently clean your skin. If you do not have soap from your surgeon, use your regular soap. Do not shave or scrub the surgery site.  Wear clean pajamas and have clean sheets on your bed.   [facility]    If you have any questions regarding to your visit please contact your care team:       Team Purple:   Clinic Hours Telephone Number   Dr. Caron Rosenthal   7am-7pm  Monday - Thursday   7am-5pm  Fridays  [phone]  (Appointment scheduling available 24/7)   Urgent Care - [place] - 11am-9pm Monday-Friday Saturday-Sunday- 9am-5pm   [place] - 5pm-9pm Monday-Friday Saturday-Sunday- 9am-5pm  [phone] - [place]  [phone] - [place]       What options do I have for a visit other than an office visit? We offer electronic visits (e-visits) and telephone visits, when medically appropriate.  Please check with your medical insurance to see if these types of visits are covered, as you will be responsible for any charges that are not paid by your insurance.      You can use Campanda (secure electronic communication) to access to your chart, send your primary care provider a message, or make an appointment. Ask a team member how to get started.     For a price quote for your services, please call  our Consumer Price Line at [phone] or our Imaging Cost estimation line at [phone] (for imaging tests).

## 2018-11-27 ENCOUNTER — OFFICE VISIT (OUTPATIENT)
Dept: FAMILY MEDICINE | Facility: CLINIC | Age: 53
End: 2018-11-27
Payer: COMMERCIAL

## 2018-11-27 VITALS
WEIGHT: 199.4 LBS | OXYGEN SATURATION: 96 % | BODY MASS INDEX: 27.04 KG/M2 | DIASTOLIC BLOOD PRESSURE: 84 MMHG | HEART RATE: 77 BPM | SYSTOLIC BLOOD PRESSURE: 138 MMHG | TEMPERATURE: 97.8 F | RESPIRATION RATE: 18 BRPM

## 2018-11-27 DIAGNOSIS — F17.200 SMOKER: ICD-10-CM

## 2018-11-27 DIAGNOSIS — Z01.818 PREOP GENERAL PHYSICAL EXAM: Primary | ICD-10-CM

## 2018-11-27 DIAGNOSIS — F51.01 PRIMARY INSOMNIA: ICD-10-CM

## 2018-11-27 PROCEDURE — 99214 OFFICE O/P EST MOD 30 MIN: CPT | Performed by: FAMILY MEDICINE

## 2018-11-27 RX ORDER — VARENICLINE TARTRATE 1 MG/1
1 TABLET, FILM COATED ORAL 2 TIMES DAILY
Qty: 56 TABLET | Refills: 1 | Status: SHIPPED | OUTPATIENT
Start: 2018-11-27 | End: 2019-04-03

## 2018-11-27 RX ORDER — TRAZODONE HYDROCHLORIDE 50 MG/1
50 TABLET, FILM COATED ORAL
Qty: 30 TABLET | Refills: 1 | Status: SHIPPED | OUTPATIENT
Start: 2018-11-27

## 2018-11-27 NOTE — MR AVS SNAPSHOT
After Visit Summary   11/27/2018    Darin Wang    MRN: [id number]           Patient Information     Date Of Birth          1965        Visit Information        Provider Department      11/27/2018 2:00 PM Mario Rosenthal MD [facility]        Today's Diagnoses     Preop general physical exam    -  1    Primary insomnia        Smoker          Care Instructions      Before Your Surgery      Call your surgeon if there is any change in your health. This includes signs of a cold or flu (such as a sore throat, runny nose, cough, rash or fever).    Do not smoke, drink alcohol or take over the counter medicine (unless your surgeon or primary care doctor tells you to) for the 24 hours before and after surgery.    If you take prescribed drugs: Follow your doctor s orders about which medicines to take and which to stop until after surgery.    Eating and drinking prior to surgery: follow the instructions from your surgeon    Take a shower or bath the night before surgery. Use the soap your surgeon gave you to gently clean your skin. If you do not have soap from your surgeon, use your regular soap. Do not shave or scrub the surgery site.  Wear clean pajamas and have clean sheets on your bed.   [facility]    If you have any questions regarding to your visit please contact your care team:       Team Purple:   Clinic Hours Telephone Number   Dr. Caron Rosenthal   7am-7pm  Monday - Thursday   7am-5pm  Fridays  [phone]  (Appointment scheduling available 24/7)   Urgent Care - [place] [facility] - 11am-9pm Monday-Friday Saturday-Sunday- 9am-5pm   [place] - 5pm-9pm Monday-Friday Saturday-Sunday- 9am-5pm  [phone] - [place]  [phone] - [place]       What options do I have for a visit other than an office visit? We offer electronic visits (e-visits) and telephone visits, when medically  appropriate.  Please check with your medical insurance to see if these types of visits are covered, as you will be responsible for any charges that are not paid by your insurance.      You can use Adapx (secure electronic communication) to access to your chart, send your primary care provider a message, or make an appointment. Ask a team member how to get started.     For a price quote for your services, please call our Consumer Price Line at [phone] or our Imaging Cost estimation line at [phone] (for imaging tests).              Follow-ups after your visit        Who to contact     If you have questions or need follow up information about today's clinic visit or your schedule please contact [facility] directly at [phone].  Normal or non-critical lab and imaging results will be communicated to you by Maktoobhart, letter or phone within 4 business days after the clinic has received the results. If you do not hear from us within 7 days, please contact the clinic through Fileforcet or phone. If you have a critical or abnormal lab result, we will notify you by phone as soon as possible.  Submit refill requests through Adapx or call your pharmacy and they will forward the refill request to us. Please allow 3 business days for your refill to be completed.          Additional Information About Your Visit        Maktoobhart Information     Adapx gives you secure access to your electronic health record. If you see a primary care provider, you can also send messages to your care team and make appointments. If you have questions, please call your primary care clinic.  If you do not have a primary care provider, please call [phone] and they will assist you.        Care EveryWhere ID     This is your Care EveryWhere ID. This could be used by other organizations to access your [place] medical records  [id number]        Your Vitals Were     Pulse Temperature Respirations Pulse Oximetry BMI (Body  Mass Index)       77 97.8  F (36.6  C) (Oral) 18 96% 27.04 kg/m2        Blood Pressure from Last 3 Encounters:   11/27/18 138/84   09/06/18 116/79   08/17/18 130/84    Weight from Last 3 Encounters:   11/27/18 199 lb 6.4 oz (90.4 kg)   09/06/18 195 lb (88.5 kg)   08/17/18 196 lb (88.9 kg)              Today, you had the following     No orders found for display         Today's Medication Changes          These changes are accurate as of 11/27/18  3:02 PM.  If you have any questions, ask your nurse or doctor.               Start taking these medicines.        Dose/Directions    traZODone 50 MG tablet   Commonly known as:  DESYREL   Used for:  Primary insomnia   Started by:  Mario Jaime MD        Dose:  50 mg   Take 1 tablet (50 mg) by mouth nightly as needed for sleep   Quantity:  30 tablet   Refills:  1         Stop taking these medicines if you haven't already. Please contact your care team if you have questions.     clonazePAM 0.5 MG tablet   Commonly known as:  klonoPIN   Stopped by:  Mario Jaime MD           FLUoxetine 10 MG capsule   Commonly known as:  PROzac   Stopped by:  Mario Jaime MD           QUEtiapine 100 MG tablet   Commonly known as:  SEROquel   Stopped by:  Mario Jaime MD                Where to get your medicines      These medications were sent to [facility] - [place], [place] - [address] AT [address]  [address], [place] [address]     Phone:  [phone]     traZODone 50 MG tablet    varenicline 1 MG tablet                Primary Care Provider Office Phone # Fax #    Mario Jaime [phone] [address]  [place] [address]        Equal Access to Services     FERMIN DUNLAP AH: Hadii aad ku hadasho Soomaali, waaxda luqadaha, qaybta kaalmada adeegyada, tracey mora. So  [facility] [phone].    ATENCIÓN: Si yanci stack, tiene a garcia disposición servicios gratuitos de asistencia lingüística. Patrick vincent [phone].    We comply with applicable federal civil rights laws and [place] laws. We do not discriminate on the basis of race, color, national origin, age, disability, sex, sexual orientation, or gender identity.            Thank you!     Thank you for choosing [facility]  for your care. Our goal is always to provide you with excellent care. Hearing back from our patients is one way we can continue to improve our services. Please take a few minutes to complete the written survey that you may receive in the mail after your visit with us. Thank you!             Your Updated Medication List - Protect others around you: Learn how to safely use, store and throw away your medicines at www.disposemymeds.org.          This list is accurate as of 11/27/18  3:02 PM.  Always use your most recent med list.                   Brand Name Dispense Instructions for use Diagnosis    diclofenac 75 MG EC tablet    VOLTAREN    60 tablet    Take 1 tablet (75 mg) by mouth 2 times daily    Primary osteoarthritis of both hips       multivitamin Tabs tablet      Take 1 tablet by mouth daily        pramipexole 0.125 MG tablet    MIRAPEX    60 tablet    TAKE 1 TO 2 TABLETS BY MOUTH AT BEDTIME    Tobacco dependence syndrome       traZODone 50 MG tablet    DESYREL    30 tablet    Take 1 tablet (50 mg) by mouth nightly as needed for sleep    Primary insomnia       * varenicline 0.5 MG X 11 & 1 MG X 42 tablet    CHANTIX STARTING MONTH CHANTALE    53 tablet    Take 0.5 mg tab daily for 3 days, then 0.5 mg tab twice daily for 4 days, then 1 mg twice daily.    Encounter for tobacco use cessation counseling       * varenicline 1 MG tablet    CHANTIX    56 tablet    Take 1 tablet (1 mg) by mouth 2 times daily    Smoker       * Notice:  This list has 2 medication(s) that are the same as other medications  prescribed for you. Read the directions carefully, and ask your doctor or other care provider to review them with you.

## 2019-01-16 ENCOUNTER — MYC MEDICAL ADVICE (OUTPATIENT)
Dept: FAMILY MEDICINE | Facility: CLINIC | Age: 54
End: 2019-01-16

## 2019-01-28 ENCOUNTER — TELEPHONE (OUTPATIENT)
Dept: FAMILY MEDICINE | Facility: CLINIC | Age: 54
End: 2019-01-28

## 2019-01-28 NOTE — LETTER
January 28, 2019        Darin Wang,  [address] [address]          Dear Darin Wang      Monitoring and managing your preventative and chronic health conditions are very important to us. Our records indicate that you have not scheduled for Colonoscopy and Depression Check  which was recommended by Dr. Jaime. Please fill out the questionnaire and return back with envelope provided.      If you have received your health care elsewhere, please call the clinic so the information can be documented in your chart.    Please call [phone] or message us through your [employer] account to schedule an appointment or provide information for your chart.     Feel free to contact us if you have any questions or concerns!    I look forward to seeing you and working with you on your health care needs.     Sincerely,       Your [place] Care Team/HV

## 2019-01-28 NOTE — TELEPHONE ENCOUNTER
Panel Management Review      Patient has the following on his problem list:     Depression / Dysthymia review    Measure:  Needs PHQ-9 score of 4 or less during index window.  Administer PHQ-9 and if score is 5 or more, send encounter to provider for next steps.    5 - 7 month window range: Failed    PHQ-9 SCORE 12/18/2012 6/10/2013 8/17/2018   PHQ-9 Total Score 18 18 -   PHQ-9 Total Score - - 26       If PHQ-9 recheck is 5 or more, route to provider for next steps.    Patient is due for:  PHQ9      Composite cancer screening  Chart review shows that this patient is due/due soon for the following Colonoscopy  Summary:    Patient is due/failing the following:   COLONOSCOPY and PHQ9    Action needed:   Patient needs to do PHQ9. and Patient needs referral/order: colonoscopy    Type of outreach:    Sent letter.    Questions for provider review:    None                                                                                                                                    Nohemi Clark CMA on 1/28/2019 at 9:17 AM       Chart routed to none .

## 2019-02-08 ENCOUNTER — MYC MEDICAL ADVICE (OUTPATIENT)
Dept: FAMILY MEDICINE | Facility: CLINIC | Age: 54
End: 2019-02-08

## 2019-03-28 NOTE — TELEPHONE ENCOUNTER
Attempt 2, called patient and left VM to call clinic to complete PHQ-9. Okay to speak to anyone on purple team.  Miranda BOWERS CMA ([facility])

## 2019-04-01 DIAGNOSIS — F17.200 SMOKER: ICD-10-CM

## 2019-04-01 ASSESSMENT — PATIENT HEALTH QUESTIONNAIRE - PHQ9: SUM OF ALL RESPONSES TO PHQ QUESTIONS 1-9: 22

## 2019-04-01 NOTE — TELEPHONE ENCOUNTER
Transferred from MA due to answering positively to PHQ-9 question #9.   Patient denies having suicidal thoughts with a plan and means to carry out the plan or the intent to injure or harm others.   Reports things are slowly but surely improving and is [place] but reports he's always struggled with things going wrong in his life and states it's always 1 step forward and 2 steps back and things always backfire on him.  Reports it is hard to sleep or can't focus or concentrate.     Requesting a refill of Chantix but reports he is not taking any other medication other than for his eye. Reports he is not taking Trazodone as it doesn't help him sleep.    Juana Smart RN

## 2019-04-03 RX ORDER — VARENICLINE TARTRATE 1 MG/1
1 TABLET, FILM COATED ORAL 2 TIMES DAILY
Qty: 56 TABLET | Refills: 1 | Status: SHIPPED | OUTPATIENT
Start: 2019-04-03

## 2019-06-28 ENCOUNTER — TELEPHONE (OUTPATIENT)
Dept: FAMILY MEDICINE | Facility: CLINIC | Age: 54
End: 2019-06-28

## 2019-06-28 NOTE — TELEPHONE ENCOUNTER
Panel Management Review      Patient has the following on his problem list:     Depression / Dysthymia review    Measure:  Needs PHQ-9 score of 4 or less during index window.  Administer PHQ-9 and if score is 5 or more, send encounter to provider for next steps.    5 - 7 month window range: FAIL    PHQ-9 SCORE 6/10/2013 8/17/2018 4/1/2019   PHQ-9 Total Score 18 - -   PHQ-9 Total Score - 26 22       If PHQ-9 recheck is 5 or more, route to provider for next steps.    Patient is due for:  PHQ9      Composite cancer screening  Chart review shows that this patient is due/due soon for the following None  Summary:    Patient is due/failing the following:   FOLLOW UP and PHQ9    Action needed:   Patient needs office visit for follow up. and Patient needs to do PHQ9.    Type of outreach:    Sent letter.    Questions for provider review:    None                                                                                                                                    Susan Nichols MA       Chart routed to Care Team .

## 2019-06-28 NOTE — LETTER
June 28, 2019          Darin Wang,  [address] [address]        Dear Darin Wang      Monitoring and managing your preventative and chronic health conditions are very important to us. Our records indicate that you have not scheduled for Appointment with your provider for follow up which was recommended by Dr. Jaime      If you have received your health care elsewhere, please call the clinic so the information can be documented in your chart.    Please call [phone] or message us through your [employer] account to schedule an appointment or provide information for your chart.     Feel free to contact us if you have any questions or concerns!    I look forward to seeing you and working with you on your health care needs.     Sincerely,       Your [facility] Team/[place]

## 2019-09-05 ENCOUNTER — TELEPHONE (OUTPATIENT)
Dept: FAMILY MEDICINE | Facility: CLINIC | Age: 54
End: 2019-09-05

## 2019-09-05 NOTE — TELEPHONE ENCOUNTER
Patient is failing PHQ9. Left VM for patient to return call to purple team  Nohemi Clark CMA on 9/5/2019 at 9:03 AM

## 2019-09-05 NOTE — LETTER
September 9, 2019          Darin Wang,  [address] [address]          Dear Darin Wang      Monitoring and managing your preventative and chronic health conditions are very important to us. Our records indicate that you have not scheduled for Depression Check  which was recommended by Dr. Jaime. Please fill out the questionnaires and return back in envelope.      If you have received your health care elsewhere, please call the clinic so the information can be documented in your chart.    Please call [phone] or message us through your [employer] account to schedule an appointment or provide information for your chart.     Feel free to contact us if you have any questions or concerns!    I look forward to seeing you and working with you on your health care needs.     Sincerely,       Your [facility] Team/HV

## 2019-09-06 NOTE — TELEPHONE ENCOUNTER
2nd attempt. Patient is failing PHQ9. Left VM for patient to return call to purple team. Sent patient MyChart.  Nohemi Clark CMA on 9/6/2019 at 10:43 AM

## 2019-10-25 ENCOUNTER — TELEPHONE (OUTPATIENT)
Dept: FAMILY MEDICINE | Facility: CLINIC | Age: 54
End: 2019-10-25

## 2019-10-25 NOTE — TELEPHONE ENCOUNTER
Called patient and left a message to complete a PHQ-9. Multiple attempts has been made to patient, sending patient a [employer] letter with questionnaire. Also will mail out the questionnaire for patient to complete just in case patient did not complete the questionnaire via 19payt. Postponing for 3 weeks, will check in with patient.   LUCIA Driver

## 2019-10-25 NOTE — LETTER
October 25, 2019          Darin Wang,  [address] [address]        Dear Darin Wang      Monitoring and managing your preventative and chronic health conditions are very important to us. Our records indicate that you are due for a Patient Health Questionnaire which was recommended by Dr. Jaime. Please complete the questionnaire and mail it back to the clinic with the pre stamped envelope.       If you have received your health care elsewhere, please call the clinic so the information can be documented in your chart.    Please call [phone] or message us through your [employer] account to schedule an appointment or provide information for your chart.     Feel free to contact us if you have any questions or concerns!    I look forward to seeing you and working with you on your health care needs.     Sincerely,       Your [facility] Team

## 2019-11-27 ENCOUNTER — TELEPHONE (OUTPATIENT)
Dept: FAMILY MEDICINE | Facility: CLINIC | Age: 54
End: 2019-11-27

## 2019-11-27 NOTE — TELEPHONE ENCOUNTER
Called patient for a PHQ-9 to do a follow up for a letter that were mailed to patient a month ago. No answer, VM left for patient to return call to clinic and complete a phq-9 or return the letter back

## 2020-02-21 ENCOUNTER — TRANSFERRED RECORDS (OUTPATIENT)
Dept: HEALTH INFORMATION MANAGEMENT | Facility: CLINIC | Age: 55
End: 2020-02-21

## 2020-02-23 ENCOUNTER — HEALTH MAINTENANCE LETTER (OUTPATIENT)
Age: 55
End: 2020-02-23

## 2020-12-06 ENCOUNTER — HEALTH MAINTENANCE LETTER (OUTPATIENT)
Age: 55
End: 2020-12-06

## 2021-01-10 ENCOUNTER — TRANSFERRED RECORDS (OUTPATIENT)
Dept: HEALTH INFORMATION MANAGEMENT | Facility: CLINIC | Age: 56
End: 2021-01-10

## 2021-01-10 LAB
ALT SERPL-CCNC: 43 IU/L (ref 6–40)
AST SERPL-CCNC: 77 IU/L (ref 10–40)
CREAT SERPL-MCNC: 0.73 MG/DL (ref 0.7–1.2)
GFR SERPL CREATININE-BSD FRML MDRD: >60 ML/MIN/1.73M*2
GLUCOSE SERPL-MCNC: 72 MG/DL (ref 70–99)
INR PPP: 1 (ref 0.9–1.1)
POTASSIUM SERPL-SCNC: 3.9 MEQ/L (ref 3.4–5.1)

## 2021-02-13 ENCOUNTER — TRANSFERRED RECORDS (OUTPATIENT)
Dept: HEALTH INFORMATION MANAGEMENT | Facility: CLINIC | Age: 56
End: 2021-02-13

## 2021-04-11 ENCOUNTER — HEALTH MAINTENANCE LETTER (OUTPATIENT)
Age: 56
End: 2021-04-11

## 2021-09-26 ENCOUNTER — HEALTH MAINTENANCE LETTER (OUTPATIENT)
Age: 56
End: 2021-09-26

## 2022-05-07 ENCOUNTER — HEALTH MAINTENANCE LETTER (OUTPATIENT)
Age: 57
End: 2022-05-07

## 2023-01-08 ENCOUNTER — HEALTH MAINTENANCE LETTER (OUTPATIENT)
Age: 58
End: 2023-01-08

## 2023-06-02 ENCOUNTER — HEALTH MAINTENANCE LETTER (OUTPATIENT)
Age: 58
End: 2023-06-02